# Patient Record
Sex: MALE | Race: WHITE | NOT HISPANIC OR LATINO | URBAN - METROPOLITAN AREA
[De-identification: names, ages, dates, MRNs, and addresses within clinical notes are randomized per-mention and may not be internally consistent; named-entity substitution may affect disease eponyms.]

---

## 2017-05-23 ENCOUNTER — OUTPATIENT (OUTPATIENT)
Dept: OUTPATIENT SERVICES | Facility: HOSPITAL | Age: 62
LOS: 1 days | End: 2017-05-23
Payer: COMMERCIAL

## 2017-05-23 PROCEDURE — 75574 CT ANGIO HRT W/3D IMAGE: CPT | Mod: 26

## 2017-05-23 PROCEDURE — 75574 CT ANGIO HRT W/3D IMAGE: CPT

## 2017-08-02 VITALS
SYSTOLIC BLOOD PRESSURE: 139 MMHG | HEIGHT: 67 IN | TEMPERATURE: 96 F | OXYGEN SATURATION: 97 % | DIASTOLIC BLOOD PRESSURE: 74 MMHG | WEIGHT: 160.28 LBS | RESPIRATION RATE: 16 BRPM | HEART RATE: 84 BPM

## 2017-08-02 NOTE — H&P ADULT - ASSESSMENT
Patient is a 63yo M, current smoker, with PMHx of HTN, DM-II, HLD, ankylosing spondylitis, ED s/p penile prosthesis and H. Pylori (recently completed abx course, followed by Dr. Christianson) who called his PMD complaining of SSCP radiating to the left arm while walking a few blocks approx one month ago presented today to [facility] for recommended cardiac cath and peripheral angiogram with possible intervention in light of pt's risk factors, CCS 3 anginal symptoms and abnormal CTA of coronaries.    ASA III and Mallampati III    OF NOTE    Risks & benefits of procedure and alternative therapy have been explained to the patient including but not limited to: allergic reaction, bleeding w/possible need for blood transfusion, infection, renal and vascular compromise, limb damage, arrhythmia, stroke, vessel dissection/perforation, Myocardial infarction, emergent CABG. Informed consent obtained and in chart.   Risks & benefits of procedure and alternative therapy have been explained to the patient including but not limited to: allergic reaction, bleeding w/possible need for blood transfusion, infection, renal and vascular compromise, limb damage, stroke, Myocardial infarction, vessel dissection/perforation, emergent Vascular Surgery. Informed consent obtained and in chart. Patient is a 63yo M, current smoker, with PMHx of HTN, DM-II, HLD, ankylosing spondylitis, ED s/p penile prosthesis and H. Pylori (recently completed abx course, followed by Dr. Christianson) who called his PMD complaining of SSCP radiating to the left arm while walking a few blocks approx one month ago presented today to [facility] for recommended cardiac cath and peripheral angiogram with possible intervention in light of pt's risk factors, CCS 3 anginal symptoms and abnormal CTA of coronaries.    ASA III and Mallampati III    OF NOTE: pt was loaded with  mg PO x1 and Plavix 300 as d/w Dr. Munoz prior to procedure.    Risks & benefits of procedure and alternative therapy have been explained to the patient including but not limited to: allergic reaction, bleeding w/possible need for blood transfusion, infection, renal and vascular compromise, limb damage, arrhythmia, stroke, vessel dissection/perforation, Myocardial infarction, emergent CABG. Informed consent obtained and in chart.   Risks & benefits of procedure and alternative therapy have been explained to the patient including but not limited to: allergic reaction, bleeding w/possible need for blood transfusion, infection, renal and vascular compromise, limb damage, stroke, Myocardial infarction, vessel dissection/perforation, emergent Vascular Surgery. Informed consent obtained and in chart.

## 2017-08-02 NOTE — H&P ADULT - NEGATIVE CARDIOVASCULAR SYMPTOMS
no paroxysmal nocturnal dyspnea/no dyspnea on exertion/no orthopnea/no palpitations/no peripheral edema/no claudication

## 2017-08-02 NOTE — H&P ADULT - RS GEN PE MLT RESP DETAILS PC
no rales/clear to auscultation bilaterally/no rhonchi/no wheezes wheezes/clear to auscultation bilaterally

## 2017-08-02 NOTE — H&P ADULT - HISTORY OF PRESENT ILLNESS
SKELETON    Patient is a 61yo M, current smoker, with PMHx of HTN, DM-II, HLD, hyperhomocysteinemia, mild carotid stenosis, ankylosing spondylitis, diastolic dysfunction who presented to his cardiologist complaining of CP on exertion with associated dizziness and near syncope. Patient subsequently underwent CTA coronaries which revealed midLAD moderate to severe mixed obstructive plaque (FFR 0.61), proxLCx moderate mixed borderline obstructive plaque (FFR 0.8), ostial RPDA moderate to severe mixed obstructive plaque (FFR 0.71), calcium score 200, normal LV function. In light of patient's risk factors, symptoms and abnormal CTA, patient is now referred to [facility] for recommended cardiac catheterization with possible intervention. Patient is a 61yo M, current smoker, with PMHx of HTN, DM-II, HLD, ankylosing spondylitis, ED s/p penile prosthesis and H. Pylori (recently completed abx course) who presented  Patient subsequently underwent CTA coronaries which revealed midLAD moderate to severe mixed obstructive plaque (FFR 0.61), proxLCx moderate mixed borderline obstructive plaque (FFR 0.8), ostial RPDA moderate to severe mixed obstructive plaque (FFR 0.71), calcium score 200, normal LV function. In light of patient's risk factors, symptoms and abnormal CTA, patient is now referred to [facility] for recommended cardiac catheterization with possible intervention. Patient is a 61yo M, current smoker, with PMHx of HTN, DM-II, HLD, ankylosing spondylitis, ED s/p penile prosthesis and H. Pylori (recently completed abx course, followed by Dr. Christianson) who called his PMD complaining of SSCP radiating to the left arm while walking a few blocks about one month ago and instructed him to go to the emergency room. Patient states his workup in the ED was negative and he was discharged home to follow up with his cardiologist. Patient also endorses dizziness when going from sitting/lying to standing position. Patient denies fatigue, palpitations, SOB, PND, orthopnea, N/V, hematochezia, melena, LE edema, syncope. Patient subsequently underwent CTA coronaries which revealed midLAD moderate to severe mixed obstructive plaque (FFR 0.61), proxLCx moderate mixed borderline obstructive plaque (FFR 0.8), ostial RPDA moderate to severe mixed obstructive plaque (FFR 0.71), calcium score 200, normal LV function. In light of patient's risk factors, class III anginal symptoms and abnormal CTA, patient is now referred to [facility] for recommended cardiac catheterization with possible intervention. Patient is a 63yo M, current smoker, with PMHx of HTN, DM-II, HLD, ankylosing spondylitis, ED s/p penile prosthesis and H. Pylori (recently completed abx course, followed by Dr. Christianson) who called his PMD complaining of SSCP radiating to the left arm while walking a few blocks approx one month ago and was instructed to go to the emergency room. Patient states his workup in the ED was negative and he was discharged home to follow up with his cardiologist. Patient also endorses dizziness when going from sitting/lying to standing position. Patient denies fatigue, palpitations, SOB, PND, orthopnea, N/V, hematochezia, melena, LE edema, syncope. Patient subsequently underwent CTA coronaries which revealed midLAD moderate to severe mixed obstructive plaque (FFR 0.61), proxLCx moderate mixed borderline obstructive plaque (FFR 0.8), ostial RPDA moderate to severe mixed obstructive plaque (FFR 0.71), calcium score 200, normal LV function. In light of patient's risk factors, class III anginal symptoms and abnormal CTA, patient is now referred to [facility] for recommended cardiac catheterization with possible intervention. Patient is a 61yo M, current smoker, with PMHx of HTN, DM-II, HLD, ankylosing spondylitis, ED s/p penile prosthesis and H. Pylori (recently completed abx course, followed by Dr. Christianson) who called his PMD complaining of SSCP radiating to the left arm while walking a few blocks approx one month ago and was instructed to go to the emergency room. Patient states his workup in the ED was negative and he was discharged home to follow up with his cardiologist. Patient also endorses dizziness when going from sitting/lying to standing position. Patient denies fatigue, palpitations, SOB, PND, orthopnea, N/V, hematochezia, melena, LE edema, syncope. Patient subsequently underwent CTA coronaries which revealed midLAD moderate to severe mixed obstructive plaque (FFR 0.61), proxLCx moderate mixed borderline obstructive plaque (FFR 0.8), ostial RPDA moderate to severe mixed obstructive plaque (FFR 0.71), calcium score 200, normal LV function.  In light of patient's risk factors, class III anginal symptoms and abnormal CTA, patient is now referred to [facility] for recommended cardiac catheterization and peripheral angiogram with possible intervention.

## 2017-08-04 ENCOUNTER — INPATIENT (INPATIENT)
Facility: HOSPITAL | Age: 62
LOS: 0 days | Discharge: HOME | DRG: 247 | End: 2017-08-05
Attending: INTERNAL MEDICINE | Admitting: INTERNAL MEDICINE
Payer: COMMERCIAL

## 2017-08-04 DIAGNOSIS — Z96.89 PRESENCE OF OTHER SPECIFIED FUNCTIONAL IMPLANTS: Chronic | ICD-10-CM

## 2017-08-04 LAB
ALBUMIN SERPL ELPH-MCNC: 4 G/DL — SIGNIFICANT CHANGE UP (ref 3.3–5)
ALP SERPL-CCNC: 77 U/L — SIGNIFICANT CHANGE UP (ref 40–120)
ALT FLD-CCNC: 11 U/L — SIGNIFICANT CHANGE UP (ref 10–45)
ANION GAP SERPL CALC-SCNC: 15 MMOL/L — SIGNIFICANT CHANGE UP (ref 5–17)
APTT BLD: 32.3 SEC — SIGNIFICANT CHANGE UP (ref 27.5–37.4)
AST SERPL-CCNC: 12 U/L — SIGNIFICANT CHANGE UP (ref 10–40)
BASOPHILS NFR BLD AUTO: 0.3 % — SIGNIFICANT CHANGE UP (ref 0–2)
BILIRUB SERPL-MCNC: 0.6 MG/DL — SIGNIFICANT CHANGE UP (ref 0.2–1.2)
BUN SERPL-MCNC: 18 MG/DL — SIGNIFICANT CHANGE UP (ref 7–23)
CALCIUM SERPL-MCNC: 9.2 MG/DL — SIGNIFICANT CHANGE UP (ref 8.4–10.5)
CHLORIDE SERPL-SCNC: 100 MMOL/L — SIGNIFICANT CHANGE UP (ref 96–108)
CHOLEST SERPL-MCNC: 112 MG/DL — SIGNIFICANT CHANGE UP (ref 10–199)
CK MB CFR SERPL CALC: 2.4 NG/ML — SIGNIFICANT CHANGE UP (ref 0–6.7)
CO2 SERPL-SCNC: 24 MMOL/L — SIGNIFICANT CHANGE UP (ref 22–31)
CREAT SERPL-MCNC: 0.6 MG/DL — SIGNIFICANT CHANGE UP (ref 0.5–1.3)
EOSINOPHIL NFR BLD AUTO: 1.5 % — SIGNIFICANT CHANGE UP (ref 0–6)
GLUCOSE SERPL-MCNC: 151 MG/DL — HIGH (ref 70–99)
HBA1C BLD-MCNC: 5.9 % — HIGH (ref 4–5.6)
HCT VFR BLD CALC: 48.3 % — SIGNIFICANT CHANGE UP (ref 39–50)
HDLC SERPL-MCNC: 53 MG/DL — SIGNIFICANT CHANGE UP (ref 40–125)
HGB BLD-MCNC: 17 G/DL — SIGNIFICANT CHANGE UP (ref 13–17)
INR BLD: 0.87 — LOW (ref 0.88–1.16)
LIPID PNL WITH DIRECT LDL SERPL: 46 MG/DL — SIGNIFICANT CHANGE UP
LYMPHOCYTES # BLD AUTO: 16.2 % — SIGNIFICANT CHANGE UP (ref 13–44)
MCHC RBC-ENTMCNC: 32.7 PG — SIGNIFICANT CHANGE UP (ref 27–34)
MCHC RBC-ENTMCNC: 35.2 G/DL — SIGNIFICANT CHANGE UP (ref 32–36)
MCV RBC AUTO: 92.9 FL — SIGNIFICANT CHANGE UP (ref 80–100)
MONOCYTES NFR BLD AUTO: 7.8 % — SIGNIFICANT CHANGE UP (ref 2–14)
NEUTROPHILS NFR BLD AUTO: 74.2 % — SIGNIFICANT CHANGE UP (ref 43–77)
PLATELET # BLD AUTO: 195 K/UL — SIGNIFICANT CHANGE UP (ref 150–400)
POTASSIUM SERPL-MCNC: 4.7 MMOL/L — SIGNIFICANT CHANGE UP (ref 3.5–5.3)
POTASSIUM SERPL-SCNC: 4.7 MMOL/L — SIGNIFICANT CHANGE UP (ref 3.5–5.3)
PROT SERPL-MCNC: 7.2 G/DL — SIGNIFICANT CHANGE UP (ref 6–8.3)
PROTHROM AB SERPL-ACNC: 9.6 SEC — LOW (ref 9.8–12.7)
RBC # BLD: 5.2 M/UL — SIGNIFICANT CHANGE UP (ref 4.2–5.8)
RBC # FLD: 12.2 % — SIGNIFICANT CHANGE UP (ref 10.3–16.9)
SODIUM SERPL-SCNC: 139 MMOL/L — SIGNIFICANT CHANGE UP (ref 135–145)
TOTAL CHOLESTEROL/HDL RATIO MEASUREMENT: 2.1 RATIO — LOW (ref 3.4–9.6)
TRIGL SERPL-MCNC: 63 MG/DL — SIGNIFICANT CHANGE UP (ref 10–149)
WBC # BLD: 11.8 K/UL — HIGH (ref 3.8–10.5)
WBC # FLD AUTO: 11.8 K/UL — HIGH (ref 3.8–10.5)

## 2017-08-04 PROCEDURE — 99222 1ST HOSP IP/OBS MODERATE 55: CPT

## 2017-08-04 PROCEDURE — 93010 ELECTROCARDIOGRAM REPORT: CPT

## 2017-08-04 PROCEDURE — 99254 IP/OBS CNSLTJ NEW/EST MOD 60: CPT

## 2017-08-04 RX ORDER — VENLAFAXINE HCL 75 MG
150 CAPSULE, EXT RELEASE 24 HR ORAL DAILY
Qty: 0 | Refills: 0 | Status: DISCONTINUED | OUTPATIENT
Start: 2017-08-04 | End: 2017-08-05

## 2017-08-04 RX ORDER — AMLODIPINE BESYLATE 2.5 MG/1
2.5 TABLET ORAL DAILY
Qty: 0 | Refills: 0 | Status: DISCONTINUED | OUTPATIENT
Start: 2017-08-04 | End: 2017-08-05

## 2017-08-04 RX ORDER — ASPIRIN/CALCIUM CARB/MAGNESIUM 324 MG
81 TABLET ORAL DAILY
Qty: 0 | Refills: 0 | Status: DISCONTINUED | OUTPATIENT
Start: 2017-08-05 | End: 2017-08-05

## 2017-08-04 RX ORDER — SODIUM CHLORIDE 9 MG/ML
500 INJECTION INTRAMUSCULAR; INTRAVENOUS; SUBCUTANEOUS
Qty: 0 | Refills: 0 | Status: DISCONTINUED | OUTPATIENT
Start: 2017-08-04 | End: 2017-08-05

## 2017-08-04 RX ORDER — CLOPIDOGREL BISULFATE 75 MG/1
75 TABLET, FILM COATED ORAL DAILY
Qty: 0 | Refills: 0 | Status: DISCONTINUED | OUTPATIENT
Start: 2017-08-05 | End: 2017-08-05

## 2017-08-04 RX ORDER — SODIUM CHLORIDE 9 MG/ML
1000 INJECTION, SOLUTION INTRAVENOUS
Qty: 0 | Refills: 0 | Status: DISCONTINUED | OUTPATIENT
Start: 2017-08-04 | End: 2017-08-05

## 2017-08-04 RX ORDER — ATORVASTATIN CALCIUM 80 MG/1
80 TABLET, FILM COATED ORAL AT BEDTIME
Qty: 0 | Refills: 0 | Status: DISCONTINUED | OUTPATIENT
Start: 2017-08-04 | End: 2017-08-05

## 2017-08-04 RX ORDER — DEXTROSE 50 % IN WATER 50 %
1 SYRINGE (ML) INTRAVENOUS ONCE
Qty: 0 | Refills: 0 | Status: DISCONTINUED | OUTPATIENT
Start: 2017-08-04 | End: 2017-08-04

## 2017-08-04 RX ORDER — INSULIN LISPRO 100/ML
VIAL (ML) SUBCUTANEOUS
Qty: 0 | Refills: 0 | Status: DISCONTINUED | OUTPATIENT
Start: 2017-08-04 | End: 2017-08-05

## 2017-08-04 RX ORDER — EMPAGLIFLOZIN 10 MG/1
1 TABLET, FILM COATED ORAL
Qty: 0 | Refills: 0 | COMMUNITY

## 2017-08-04 RX ORDER — VENLAFAXINE HCL 75 MG
1 CAPSULE, EXT RELEASE 24 HR ORAL
Qty: 0 | Refills: 0 | COMMUNITY

## 2017-08-04 RX ORDER — LOSARTAN POTASSIUM 100 MG/1
1 TABLET, FILM COATED ORAL
Qty: 0 | Refills: 0 | COMMUNITY

## 2017-08-04 RX ORDER — SODIUM CHLORIDE 9 MG/ML
1000 INJECTION, SOLUTION INTRAVENOUS
Qty: 0 | Refills: 0 | Status: DISCONTINUED | OUTPATIENT
Start: 2017-08-04 | End: 2017-08-04

## 2017-08-04 RX ORDER — LOSARTAN POTASSIUM 100 MG/1
25 TABLET, FILM COATED ORAL DAILY
Qty: 0 | Refills: 0 | Status: DISCONTINUED | OUTPATIENT
Start: 2017-08-04 | End: 2017-08-05

## 2017-08-04 RX ORDER — CLOPIDOGREL BISULFATE 75 MG/1
300 TABLET, FILM COATED ORAL ONCE
Qty: 0 | Refills: 0 | Status: COMPLETED | OUTPATIENT
Start: 2017-08-04 | End: 2017-08-04

## 2017-08-04 RX ORDER — CLOPIDOGREL BISULFATE 75 MG/1
1 TABLET, FILM COATED ORAL
Qty: 0 | Refills: 0 | COMMUNITY

## 2017-08-04 RX ORDER — DEXTROSE 50 % IN WATER 50 %
25 SYRINGE (ML) INTRAVENOUS ONCE
Qty: 0 | Refills: 0 | Status: DISCONTINUED | OUTPATIENT
Start: 2017-08-04 | End: 2017-08-05

## 2017-08-04 RX ORDER — DEXTROSE 50 % IN WATER 50 %
25 SYRINGE (ML) INTRAVENOUS ONCE
Qty: 0 | Refills: 0 | Status: DISCONTINUED | OUTPATIENT
Start: 2017-08-04 | End: 2017-08-04

## 2017-08-04 RX ORDER — SODIUM CHLORIDE 9 MG/ML
500 INJECTION INTRAMUSCULAR; INTRAVENOUS; SUBCUTANEOUS
Qty: 0 | Refills: 0 | Status: DISCONTINUED | OUTPATIENT
Start: 2017-08-04 | End: 2017-08-04

## 2017-08-04 RX ORDER — ASPIRIN/CALCIUM CARB/MAGNESIUM 324 MG
1 TABLET ORAL
Qty: 0 | Refills: 0 | COMMUNITY

## 2017-08-04 RX ORDER — ATORVASTATIN CALCIUM 80 MG/1
1 TABLET, FILM COATED ORAL
Qty: 0 | Refills: 0 | COMMUNITY

## 2017-08-04 RX ORDER — GLUCAGON INJECTION, SOLUTION 0.5 MG/.1ML
1 INJECTION, SOLUTION SUBCUTANEOUS ONCE
Qty: 0 | Refills: 0 | Status: DISCONTINUED | OUTPATIENT
Start: 2017-08-04 | End: 2017-08-04

## 2017-08-04 RX ORDER — ASPIRIN/CALCIUM CARB/MAGNESIUM 324 MG
325 TABLET ORAL ONCE
Qty: 0 | Refills: 0 | Status: COMPLETED | OUTPATIENT
Start: 2017-08-04 | End: 2017-08-04

## 2017-08-04 RX ORDER — CHLORHEXIDINE GLUCONATE 213 G/1000ML
1 SOLUTION TOPICAL ONCE
Qty: 0 | Refills: 0 | Status: DISCONTINUED | OUTPATIENT
Start: 2017-08-04 | End: 2017-08-05

## 2017-08-04 RX ORDER — GLUCAGON INJECTION, SOLUTION 0.5 MG/.1ML
1 INJECTION, SOLUTION SUBCUTANEOUS ONCE
Qty: 0 | Refills: 0 | Status: DISCONTINUED | OUTPATIENT
Start: 2017-08-04 | End: 2017-08-05

## 2017-08-04 RX ORDER — DEXTROSE 50 % IN WATER 50 %
12.5 SYRINGE (ML) INTRAVENOUS ONCE
Qty: 0 | Refills: 0 | Status: DISCONTINUED | OUTPATIENT
Start: 2017-08-04 | End: 2017-08-04

## 2017-08-04 RX ORDER — DEXTROSE 50 % IN WATER 50 %
1 SYRINGE (ML) INTRAVENOUS ONCE
Qty: 0 | Refills: 0 | Status: DISCONTINUED | OUTPATIENT
Start: 2017-08-04 | End: 2017-08-05

## 2017-08-04 RX ORDER — DEXTROSE 50 % IN WATER 50 %
12.5 SYRINGE (ML) INTRAVENOUS ONCE
Qty: 0 | Refills: 0 | Status: DISCONTINUED | OUTPATIENT
Start: 2017-08-04 | End: 2017-08-05

## 2017-08-04 RX ORDER — INSULIN LISPRO 100/ML
VIAL (ML) SUBCUTANEOUS ONCE
Qty: 0 | Refills: 0 | Status: COMPLETED | OUTPATIENT
Start: 2017-08-04 | End: 2017-08-04

## 2017-08-04 RX ADMIN — ATORVASTATIN CALCIUM 80 MILLIGRAM(S): 80 TABLET, FILM COATED ORAL at 21:51

## 2017-08-04 RX ADMIN — CLOPIDOGREL BISULFATE 300 MILLIGRAM(S): 75 TABLET, FILM COATED ORAL at 13:06

## 2017-08-04 RX ADMIN — SODIUM CHLORIDE 75 MILLILITER(S): 9 INJECTION INTRAMUSCULAR; INTRAVENOUS; SUBCUTANEOUS at 19:17

## 2017-08-04 RX ADMIN — SODIUM CHLORIDE 75 MILLILITER(S): 9 INJECTION INTRAMUSCULAR; INTRAVENOUS; SUBCUTANEOUS at 12:42

## 2017-08-04 RX ADMIN — Medication 2: at 21:51

## 2017-08-04 RX ADMIN — Medication 325 MILLIGRAM(S): at 13:00

## 2017-08-05 ENCOUNTER — TRANSCRIPTION ENCOUNTER (OUTPATIENT)
Age: 62
End: 2017-08-05

## 2017-08-05 VITALS — TEMPERATURE: 97 F

## 2017-08-05 LAB
ALBUMIN SERPL ELPH-MCNC: 3.9 G/DL — SIGNIFICANT CHANGE UP (ref 3.3–5)
ALP SERPL-CCNC: 75 U/L — SIGNIFICANT CHANGE UP (ref 40–120)
ALT FLD-CCNC: 10 U/L — SIGNIFICANT CHANGE UP (ref 10–45)
ANION GAP SERPL CALC-SCNC: 11 MMOL/L — SIGNIFICANT CHANGE UP (ref 5–17)
AST SERPL-CCNC: 13 U/L — SIGNIFICANT CHANGE UP (ref 10–40)
BILIRUB SERPL-MCNC: 0.3 MG/DL — SIGNIFICANT CHANGE UP (ref 0.2–1.2)
BUN SERPL-MCNC: 16 MG/DL — SIGNIFICANT CHANGE UP (ref 7–23)
CALCIUM SERPL-MCNC: 9 MG/DL — SIGNIFICANT CHANGE UP (ref 8.4–10.5)
CHLORIDE SERPL-SCNC: 105 MMOL/L — SIGNIFICANT CHANGE UP (ref 96–108)
CO2 SERPL-SCNC: 25 MMOL/L — SIGNIFICANT CHANGE UP (ref 22–31)
CREAT SERPL-MCNC: 0.6 MG/DL — SIGNIFICANT CHANGE UP (ref 0.5–1.3)
GLUCOSE SERPL-MCNC: 244 MG/DL — HIGH (ref 70–99)
HCT VFR BLD CALC: 47 % — SIGNIFICANT CHANGE UP (ref 39–50)
HGB BLD-MCNC: 15.5 G/DL — SIGNIFICANT CHANGE UP (ref 13–17)
MAGNESIUM SERPL-MCNC: 2 MG/DL — SIGNIFICANT CHANGE UP (ref 1.6–2.6)
MCHC RBC-ENTMCNC: 31.6 PG — SIGNIFICANT CHANGE UP (ref 27–34)
MCHC RBC-ENTMCNC: 33 G/DL — SIGNIFICANT CHANGE UP (ref 32–36)
MCV RBC AUTO: 95.7 FL — SIGNIFICANT CHANGE UP (ref 80–100)
PLATELET # BLD AUTO: 171 K/UL — SIGNIFICANT CHANGE UP (ref 150–400)
POTASSIUM SERPL-MCNC: 4.3 MMOL/L — SIGNIFICANT CHANGE UP (ref 3.5–5.3)
POTASSIUM SERPL-SCNC: 4.3 MMOL/L — SIGNIFICANT CHANGE UP (ref 3.5–5.3)
PROT SERPL-MCNC: 6.2 G/DL — SIGNIFICANT CHANGE UP (ref 6–8.3)
RBC # BLD: 4.91 M/UL — SIGNIFICANT CHANGE UP (ref 4.2–5.8)
RBC # FLD: 12.1 % — SIGNIFICANT CHANGE UP (ref 10.3–16.9)
SODIUM SERPL-SCNC: 141 MMOL/L — SIGNIFICANT CHANGE UP (ref 135–145)
WBC # BLD: 10.6 K/UL — HIGH (ref 3.8–10.5)
WBC # FLD AUTO: 10.6 K/UL — HIGH (ref 3.8–10.5)

## 2017-08-05 PROCEDURE — 93010 ELECTROCARDIOGRAM REPORT: CPT

## 2017-08-05 PROCEDURE — 99239 HOSP IP/OBS DSCHRG MGMT >30: CPT

## 2017-08-05 RX ORDER — METOPROLOL TARTRATE 50 MG
25 TABLET ORAL
Qty: 0 | Refills: 0 | Status: DISCONTINUED | OUTPATIENT
Start: 2017-08-05 | End: 2017-08-05

## 2017-08-05 RX ORDER — SITAGLIPTIN AND METFORMIN HYDROCHLORIDE 500; 50 MG/1; MG/1
1 TABLET, FILM COATED ORAL
Qty: 0 | Refills: 0 | COMMUNITY

## 2017-08-05 RX ORDER — METOPROLOL TARTRATE 50 MG
1 TABLET ORAL
Qty: 0 | Refills: 0 | COMMUNITY
Start: 2017-08-05

## 2017-08-05 RX ORDER — METOPROLOL TARTRATE 50 MG
25 TABLET ORAL DAILY
Qty: 0 | Refills: 0 | Status: DISCONTINUED | OUTPATIENT
Start: 2017-08-05 | End: 2017-08-05

## 2017-08-05 RX ORDER — METOPROLOL TARTRATE 50 MG
1 TABLET ORAL
Qty: 30 | Refills: 1 | OUTPATIENT
Start: 2017-08-05 | End: 2017-10-03

## 2017-08-05 RX ORDER — AMLODIPINE BESYLATE 2.5 MG/1
1 TABLET ORAL
Qty: 0 | Refills: 0 | COMMUNITY

## 2017-08-05 RX ORDER — METOPROLOL TARTRATE 50 MG
1 TABLET ORAL
Qty: 60 | Refills: 1 | OUTPATIENT
Start: 2017-08-05 | End: 2017-10-03

## 2017-08-05 RX ADMIN — Medication 3: at 06:11

## 2017-08-05 RX ADMIN — LOSARTAN POTASSIUM 25 MILLIGRAM(S): 100 TABLET, FILM COATED ORAL at 06:11

## 2017-08-05 RX ADMIN — CLOPIDOGREL BISULFATE 75 MILLIGRAM(S): 75 TABLET, FILM COATED ORAL at 10:48

## 2017-08-05 RX ADMIN — AMLODIPINE BESYLATE 2.5 MILLIGRAM(S): 2.5 TABLET ORAL at 06:11

## 2017-08-05 RX ADMIN — Medication 81 MILLIGRAM(S): at 10:48

## 2017-08-05 RX ADMIN — Medication 150 MILLIGRAM(S): at 10:48

## 2017-08-05 RX ADMIN — Medication 25 MILLIGRAM(S): at 11:50

## 2017-08-05 NOTE — DISCHARGE NOTE ADULT - PATIENT PORTAL LINK FT
“You can access the Follow[facility] Patient Portal, offered by [facility], by registering with the following website: [email or web address]”

## 2017-08-05 NOTE — DISCHARGE NOTE ADULT - PLAN OF CARE
Continue Aspirin and Plavix (also known as Clopidogrel) - You had stent to the LAD artery.  Please continue Aspirin and Plavix to keep the stent open.   - Follow up with Dr. Zavala today at [place].   -Adding a new medication for the heart which can take care of the blood pressure as well. it is called Metoprolol 25 mg by mouth 1 tablet twice daily.   We send the prescription to your local pharmacy [place].  Future refill to be done by your cardiologist  - Wound care: Avoid strenuous activities such as lifting, running, pushing or sex for 1 week in order to avoid bleeding complications in the groin.  If any questions about the groin, call us at [phone].  Ok to shower tomorrow evening. Avoid bathing for 1 week - Restart Janumet on Monday 8/7/17  - ok to continue Jardiance today.   Low carb diet -Continue your Losartan and Amlopidine  Low salt diet - You had stent to the LAD artery.  Please continue Aspirin and Plavix to keep the stent open.   - Follow up with Dr. Zavala today at [place].   -Adding a new medication for the heart which can take care of the blood pressure as well. it is called Metoprolol succinate 25 mg by mouth 1 tablet once daily.   We send the prescription to your local pharmacy [place].  Future refill to be done by your cardiologist  - Wound care: Avoid strenuous activities such as lifting, running, pushing or sex for 1 week in order to avoid bleeding complications in the groin.  If any questions about the groin, call us at [phone].  Ok to shower tomorrow evening. Avoid bathing for 1 week -Continue your Losartan.    - Stop Amlodipine  Low salt diet

## 2017-08-05 NOTE — DISCHARGE NOTE ADULT - MEDICATION SUMMARY - MEDICATIONS TO TAKE
I will START or STAY ON the medications listed below when I get home from the hospital:    aspirin 81 mg oral tablet  -- 1 tab(s) by mouth once a day  -- Indication: For Coronary artery disease / stent     losartan 25 mg oral tablet  -- 1 tab(s) by mouth once a day  -- Indication: For HTN (hypertension)    venlafaxine 150 mg oral capsule, extended release  -- 1 cap(s) by mouth once a day  -- Indication: For Depression     Janumet 50 mg-1000 mg oral tablet  -- 1 tab(s) by mouth 2 times a day  ** resume on Monday 8/7/17  -- Indication: For DM (diabetes mellitus)    Jardiance 25 mg oral tablet  -- 1 tab(s) by mouth once a day (in the morning)  -- Indication: For DM (diabetes mellitus)    atorvastatin 80 mg oral tablet  -- 1 tab(s) by mouth once a day  -- Indication: For Cholesterol lowering    Plavix 75 mg oral tablet  -- 1 tab(s) by mouth once a day  -- Indication: For Coronary artery disease / stent     Metoprolol Tartrate 25 mg oral tablet  -- 1 tab(s) by mouth 2 times a day  ** NEW medication  -- It is very important that you take or use this exactly as directed.  Do not skip doses or discontinue unless directed by your doctor.  May cause drowsiness.  Alcohol may intensify this effect.  Use care when operating dangerous machinery.  Some non-prescription drugs may aggravate your condition.  Read all labels carefully.  If a warning appears, check with your doctor before taking.  Take with food or milk.  This drug may impair the ability to drive or operate machinery.  Use care until you become familiar with its effects.    -- Indication: For Coronary artery disease / hypertension     Norvasc 2.5 mg oral tablet  -- 1 tab(s) by mouth once a day  -- Indication: For HTN (hypertension) I will START or STAY ON the medications listed below when I get home from the hospital:    aspirin 81 mg oral tablet  -- 1 tab(s) by mouth once a day  -- Indication: For Coronary artery disease / stent     losartan 25 mg oral tablet  -- 1 tab(s) by mouth once a day  -- Indication: For HTN (hypertension)    venlafaxine 150 mg oral capsule, extended release  -- 1 cap(s) by mouth once a day  -- Indication: For Depression     Jardiance 25 mg oral tablet  -- 1 tab(s) by mouth once a day (in the morning)  -- Indication: For DM (diabetes mellitus)    Janumet 50 mg-1000 mg oral tablet  -- 1 tab(s) by mouth 2 times a day  ** resume on Monday 8/7/17  -- Indication: For DM (diabetes mellitus)    atorvastatin 80 mg oral tablet  -- 1 tab(s) by mouth once a day  -- Indication: For Cholesterol lowering    Plavix 75 mg oral tablet  -- 1 tab(s) by mouth once a day  -- Indication: For Coronary artery disease / stent     metoprolol succinate 25 mg oral tablet, extended release  -- 1 tab(s) by mouth once a day  ** NEW med  -- Indication: For Coronary artery disease / blood pressure

## 2017-08-05 NOTE — DISCHARGE NOTE ADULT - CARE PROVIDER_API CALL
Reno Munoz (MD; PhD), Cardiovascular Disease; Interventional Cardiology  [facility] [address] [place] [address]  Phone: [phone]  Fax: [phone]

## 2017-08-05 NOTE — DISCHARGE NOTE ADULT - CARE PLAN
Principal Discharge DX:	CAD (coronary artery disease)  Goal:	Continue Aspirin and Plavix (also known as Clopidogrel)  Instructions for follow-up, activity and diet:	- You had stent to the LAD artery.  Please continue Aspirin and Plavix to keep the stent open.   - Follow up with Dr. Zavala today at [place].   -Adding a new medication for the heart which can take care of the blood pressure as well. it is called Metoprolol 25 mg by mouth 1 tablet twice daily.   We send the prescription to your local pharmacy [place].  Future refill to be done by your cardiologist  - Wound care: Avoid strenuous activities such as lifting, running, pushing or sex for 1 week in order to avoid bleeding complications in the groin.  If any questions about the groin, call us at [phone].  Ok to shower tomorrow evening. Avoid bathing for 1 week  Secondary Diagnosis:	DM (diabetes mellitus)  Instructions for follow-up, activity and diet:	- Restart Janumet on Monday 8/7/17  - ok to continue Jardiance today.   Low carb diet  Secondary Diagnosis:	HTN (hypertension)  Instructions for follow-up, activity and diet:	-Continue your Losartan and Amlopidine  Low salt diet Principal Discharge DX:	CAD (coronary artery disease)  Goal:	Continue Aspirin and Plavix (also known as Clopidogrel)  Instructions for follow-up, activity and diet:	- You had stent to the LAD artery.  Please continue Aspirin and Plavix to keep the stent open.   - Follow up with Dr. Zavala today at [place].   -Adding a new medication for the heart which can take care of the blood pressure as well. it is called Metoprolol 25 mg by mouth 1 tablet twice daily.   We send the prescription to your local pharmacy [place].  Future refill to be done by your cardiologist  - Wound care: Avoid strenuous activities such as lifting, running, pushing or sex for 1 week in order to avoid bleeding complications in the groin.  If any questions about the groin, call us at [phone].  Ok to shower tomorrow evening. Avoid bathing for 1 week  Secondary Diagnosis:	DM (diabetes mellitus)  Instructions for follow-up, activity and diet:	- Restart Janumet on Monday 8/7/17  - ok to continue Jardiance today.   Low carb diet  Secondary Diagnosis:	HTN (hypertension)  Instructions for follow-up, activity and diet:	-Continue your Losartan and Amlopidine  Low salt diet Principal Discharge DX:	CAD (coronary artery disease)  Goal:	Continue Aspirin and Plavix (also known as Clopidogrel)  Instructions for follow-up, activity and diet:	- You had stent to the LAD artery.  Please continue Aspirin and Plavix to keep the stent open.   - Follow up with Dr. Zavala today at [place].   -Adding a new medication for the heart which can take care of the blood pressure as well. it is called Metoprolol 25 mg by mouth 1 tablet twice daily.   We send the prescription to your local pharmacy [place].  Future refill to be done by your cardiologist  - Wound care: Avoid strenuous activities such as lifting, running, pushing or sex for 1 week in order to avoid bleeding complications in the groin.  If any questions about the groin, call us at [phone].  Ok to shower tomorrow evening. Avoid bathing for 1 week  Secondary Diagnosis:	DM (diabetes mellitus)  Instructions for follow-up, activity and diet:	- Restart Janumet on Monday 8/7/17  - ok to continue Jardiance today.   Low carb diet  Secondary Diagnosis:	HTN (hypertension)  Instructions for follow-up, activity and diet:	-Continue your Losartan and Amlopidine  Low salt diet Principal Discharge DX:	CAD (coronary artery disease)  Goal:	Continue Aspirin and Plavix (also known as Clopidogrel)  Instructions for follow-up, activity and diet:	- You had stent to the LAD artery.  Please continue Aspirin and Plavix to keep the stent open.   - Follow up with Dr. Zavala today at [place].   -Adding a new medication for the heart which can take care of the blood pressure as well. it is called Metoprolol succinate 25 mg by mouth 1 tablet once daily.   We send the prescription to your local pharmacy [place].  Future refill to be done by your cardiologist  - Wound care: Avoid strenuous activities such as lifting, running, pushing or sex for 1 week in order to avoid bleeding complications in the groin.  If any questions about the groin, call us at [phone].  Ok to shower tomorrow evening. Avoid bathing for 1 week  Secondary Diagnosis:	DM (diabetes mellitus)  Instructions for follow-up, activity and diet:	- Restart Janumet on Monday 8/7/17  - ok to continue Jardiance today.   Low carb diet  Secondary Diagnosis:	HTN (hypertension)  Instructions for follow-up, activity and diet:	-Continue your Losartan.    - Stop Amlodipine  Low salt diet Principal Discharge DX:	CAD (coronary artery disease)  Goal:	Continue Aspirin and Plavix (also known as Clopidogrel)  Instructions for follow-up, activity and diet:	- You had stent to the LAD artery.  Please continue Aspirin and Plavix to keep the stent open.   - Follow up with Dr. Zavala today at [place].   -Adding a new medication for the heart which can take care of the blood pressure as well. it is called Metoprolol succinate 25 mg by mouth 1 tablet once daily.   We send the prescription to your local pharmacy [place].  Future refill to be done by your cardiologist  - Wound care: Avoid strenuous activities such as lifting, running, pushing or sex for 1 week in order to avoid bleeding complications in the groin.  If any questions about the groin, call us at [phone].  Ok to shower tomorrow evening. Avoid bathing for 1 week  Secondary Diagnosis:	DM (diabetes mellitus)  Instructions for follow-up, activity and diet:	- Restart Janumet on Monday 8/7/17  - ok to continue Jardiance today.   Low carb diet  Secondary Diagnosis:	HTN (hypertension)  Instructions for follow-up, activity and diet:	-Continue your Losartan.    - Stop Amlodipine  Low salt diet

## 2017-08-05 NOTE — DISCHARGE NOTE ADULT - HOSPITAL COURSE
63yo M, current smoker, with PMHx of HTN, DM-II, HLD, ankylosing spondylitis, ED s/p penile prosthesis and H. Pylori (recently completed abx course, followed by Dr. Christianson) who called his PMD complaining of SSCP radiating to the left arm while walking a few blocks approx one month ago and was instructed to go to the emergency room. Patient states his workup in the ED was negative and he was discharged home to follow up with his cardiologist. Patient also endorses dizziness when going from sitting/lying to standing position. Patient denies fatigue, palpitations, SOB, PND, orthopnea, N/V, hematochezia, melena, LE edema, syncope. Patient subsequently underwent CTA coronaries which revealed midLAD moderate to severe mixed obstructive plaque (FFR 0.61), proxLCx moderate mixed borderline obstructive plaque (FFR 0.8), ostial RPDA moderate to severe mixed obstructive plaque (FFR 0.71), calcium score 200, normal LV function.  In light of patient's risk factors, class III anginal symptoms and abnormal CTA, patient is now referred to [facility] for recommended cardiac catheterization and peripheral angiogram with possible intervention. 61yo M, current smoker, with PMHx of HTN, DM-II, HLD, ankylosing spondylitis, ED s/p penile prosthesis and H. Pylori (recently completed abx course, followed by Dr. Christianson) who called his PMD complaining of SSCP radiating to the left arm while walking a few blocks approx one month ago and was instructed to go to the emergency room. Patient states his workup in the ED was negative and he was discharged home to follow up with his cardiologist. Patient also endorses dizziness when going from sitting/lying to standing position. Patient denies fatigue, palpitations, SOB, PND, orthopnea, N/V, hematochezia, melena, LE edema, syncope. Patient subsequently underwent CTA coronaries which revealed midLAD moderate to severe mixed obstructive plaque (FFR 0.61), proxLCx moderate mixed borderline obstructive plaque (FFR 0.8), ostial RPDA moderate to severe mixed obstructive plaque (FFR 0.71), calcium score 200, normal LV function.  In light of patient's risk factors, class III anginal symptoms and abnormal CTA, patient is now referred to [facility] for recommended cardiac catheterization and peripheral angiogram with possible intervention.  s/p cath with NATACHA to prox and mid LAD on 8/4/17. Peripheral angiogram was done as well, no signficant disease. Add Metoprolol for CAD.  Stable for discharge and see Dr. Zavala today in [place].

## 2017-08-07 PROCEDURE — 83735 ASSAY OF MAGNESIUM: CPT

## 2017-08-07 PROCEDURE — C1769: CPT

## 2017-08-07 PROCEDURE — C1894: CPT

## 2017-08-07 PROCEDURE — C1887: CPT

## 2017-08-07 PROCEDURE — C1760: CPT

## 2017-08-07 PROCEDURE — 85730 THROMBOPLASTIN TIME PARTIAL: CPT

## 2017-08-07 PROCEDURE — 85025 COMPLETE CBC W/AUTO DIFF WBC: CPT

## 2017-08-07 PROCEDURE — C1889: CPT

## 2017-08-07 PROCEDURE — 93005 ELECTROCARDIOGRAM TRACING: CPT

## 2017-08-07 PROCEDURE — 85610 PROTHROMBIN TIME: CPT

## 2017-08-07 PROCEDURE — 80053 COMPREHEN METABOLIC PANEL: CPT

## 2017-08-07 PROCEDURE — C1725: CPT

## 2017-08-07 PROCEDURE — 82550 ASSAY OF CK (CPK): CPT

## 2017-08-07 PROCEDURE — C1874: CPT

## 2017-08-07 PROCEDURE — 36415 COLL VENOUS BLD VENIPUNCTURE: CPT

## 2017-08-07 PROCEDURE — 80061 LIPID PANEL: CPT

## 2017-08-07 PROCEDURE — 83036 HEMOGLOBIN GLYCOSYLATED A1C: CPT

## 2017-08-07 PROCEDURE — C1753: CPT

## 2017-08-07 PROCEDURE — 82553 CREATINE MB FRACTION: CPT

## 2017-08-07 PROCEDURE — 82248 BILIRUBIN DIRECT: CPT

## 2017-08-07 PROCEDURE — 85027 COMPLETE CBC AUTOMATED: CPT

## 2017-08-08 DIAGNOSIS — I10 ESSENTIAL (PRIMARY) HYPERTENSION: ICD-10-CM

## 2017-08-08 DIAGNOSIS — I25.118 ATHEROSCLEROTIC HEART DISEASE OF NATIVE CORONARY ARTERY WITH OTHER FORMS OF ANGINA PECTORIS: ICD-10-CM

## 2017-08-08 DIAGNOSIS — F32.9 MAJOR DEPRESSIVE DISORDER, SINGLE EPISODE, UNSPECIFIED: ICD-10-CM

## 2017-08-08 DIAGNOSIS — M45.9 ANKYLOSING SPONDYLITIS OF UNSPECIFIED SITES IN SPINE: ICD-10-CM

## 2017-08-08 DIAGNOSIS — Z79.84 LONG TERM (CURRENT) USE OF ORAL HYPOGLYCEMIC DRUGS: ICD-10-CM

## 2017-08-08 DIAGNOSIS — F17.210 NICOTINE DEPENDENCE, CIGARETTES, UNCOMPLICATED: ICD-10-CM

## 2017-08-08 DIAGNOSIS — E11.9 TYPE 2 DIABETES MELLITUS WITHOUT COMPLICATIONS: ICD-10-CM

## 2017-08-08 DIAGNOSIS — I73.9 PERIPHERAL VASCULAR DISEASE, UNSPECIFIED: ICD-10-CM

## 2017-08-08 DIAGNOSIS — R07.9 CHEST PAIN, UNSPECIFIED: ICD-10-CM

## 2017-12-06 PROBLEM — K27.9 PEPTIC ULCER, SITE UNSPECIFIED, UNSPECIFIED AS ACUTE OR CHRONIC, WITHOUT HEMORRHAGE OR PERFORATION: Chronic | Status: ACTIVE | Noted: 2017-08-02

## 2017-12-06 PROBLEM — E78.5 HYPERLIPIDEMIA, UNSPECIFIED: Chronic | Status: ACTIVE | Noted: 2017-08-02

## 2017-12-06 PROBLEM — I10 ESSENTIAL (PRIMARY) HYPERTENSION: Chronic | Status: ACTIVE | Noted: 2017-08-02

## 2017-12-06 PROBLEM — M45.9 ANKYLOSING SPONDYLITIS OF UNSPECIFIED SITES IN SPINE: Chronic | Status: ACTIVE | Noted: 2017-08-02

## 2017-12-06 PROBLEM — E11.9 TYPE 2 DIABETES MELLITUS WITHOUT COMPLICATIONS: Chronic | Status: ACTIVE | Noted: 2017-08-02

## 2017-12-12 ENCOUNTER — APPOINTMENT (OUTPATIENT)
Dept: UROLOGY | Facility: CLINIC | Age: 62
End: 2017-12-12
Payer: MEDICARE

## 2017-12-12 DIAGNOSIS — Z78.9 OTHER SPECIFIED HEALTH STATUS: ICD-10-CM

## 2017-12-12 DIAGNOSIS — M45.9 ANKYLOSING SPONDYLITIS OF UNSPECIFIED SITES IN SPINE: ICD-10-CM

## 2017-12-12 DIAGNOSIS — R35.0 FREQUENCY OF MICTURITION: ICD-10-CM

## 2017-12-12 DIAGNOSIS — E11.9 TYPE 2 DIABETES MELLITUS W/OUT COMPLICATIONS: ICD-10-CM

## 2017-12-12 DIAGNOSIS — F17.200 NICOTINE DEPENDENCE, UNSPECIFIED, UNCOMPLICATED: ICD-10-CM

## 2017-12-12 LAB
BILIRUB UR QL STRIP: NORMAL
CLARITY UR: CLEAR
GLUCOSE UR-MCNC: NORMAL
HCG UR QL: 0.2 EU/DL
HGB UR QL STRIP.AUTO: NORMAL
KETONES UR-MCNC: NORMAL
LEUKOCYTE ESTERASE UR QL STRIP: NORMAL
NITRITE UR QL STRIP: NORMAL
PH UR STRIP: 5.5
PROT UR STRIP-MCNC: NORMAL
SP GR UR STRIP: 1.01

## 2017-12-12 PROCEDURE — 51798 US URINE CAPACITY MEASURE: CPT | Mod: 59

## 2017-12-12 PROCEDURE — 99204 OFFICE O/P NEW MOD 45 MIN: CPT

## 2017-12-12 PROCEDURE — 51741 ELECTRO-UROFLOWMETRY FIRST: CPT

## 2017-12-12 RX ORDER — SITAGLIPTIN AND METFORMIN HYDROCHLORIDE 50; 500 MG/1; MG/1
50-500 TABLET, FILM COATED ORAL
Refills: 0 | Status: ACTIVE | COMMUNITY

## 2017-12-12 RX ORDER — ASPIRIN 81 MG
81 TABLET,CHEWABLE ORAL
Refills: 0 | Status: ACTIVE | COMMUNITY

## 2017-12-12 RX ORDER — METOPROLOL TARTRATE 50 MG/1
50 TABLET, FILM COATED ORAL
Refills: 0 | Status: ACTIVE | COMMUNITY

## 2017-12-12 RX ORDER — LOSARTAN POTASSIUM 25 MG/1
25 TABLET, FILM COATED ORAL
Refills: 0 | Status: ACTIVE | COMMUNITY

## 2017-12-12 RX ORDER — CLOPIDOGREL 75 MG/1
75 TABLET, FILM COATED ORAL
Refills: 0 | Status: ACTIVE | COMMUNITY

## 2017-12-12 RX ORDER — EMPAGLIFLOZIN 25 MG/1
25 TABLET, FILM COATED ORAL
Refills: 0 | Status: ACTIVE | COMMUNITY

## 2017-12-12 RX ORDER — AMLODIPINE BESYLATE 2.5 MG/1
2.5 TABLET ORAL
Refills: 0 | Status: ACTIVE | COMMUNITY

## 2017-12-12 RX ORDER — ATORVASTATIN CALCIUM 80 MG/1
80 TABLET, FILM COATED ORAL
Refills: 0 | Status: ACTIVE | COMMUNITY

## 2017-12-13 PROBLEM — Z78.9 SOCIAL ALCOHOL USE: Status: ACTIVE | Noted: 2017-12-13

## 2017-12-13 PROBLEM — F17.200 CURRENT SOME DAY SMOKER: Status: ACTIVE | Noted: 2017-12-13

## 2017-12-13 PROBLEM — M45.9 ANKYLOSING SPONDYLITIS: Status: ACTIVE | Noted: 2017-12-13

## 2017-12-13 PROBLEM — E11.9 TYPE 2 DIABETES MELLITUS: Status: ACTIVE | Noted: 2017-12-13

## 2017-12-18 ENCOUNTER — OUTPATIENT (OUTPATIENT)
Dept: OUTPATIENT SERVICES | Facility: HOSPITAL | Age: 62
LOS: 1 days | End: 2017-12-18
Payer: MEDICARE

## 2017-12-18 DIAGNOSIS — Z96.89 PRESENCE OF OTHER SPECIFIED FUNCTIONAL IMPLANTS: Chronic | ICD-10-CM

## 2017-12-18 DIAGNOSIS — R07.9 CHEST PAIN, UNSPECIFIED: ICD-10-CM

## 2017-12-18 DIAGNOSIS — I25.10 ATHEROSCLEROTIC HEART DISEASE OF NATIVE CORONARY ARTERY WITHOUT ANGINA PECTORIS: ICD-10-CM

## 2017-12-18 PROCEDURE — 93016 CV STRESS TEST SUPVJ ONLY: CPT

## 2017-12-18 PROCEDURE — 78452 HT MUSCLE IMAGE SPECT MULT: CPT

## 2017-12-18 PROCEDURE — 78452 HT MUSCLE IMAGE SPECT MULT: CPT | Mod: 26

## 2017-12-18 PROCEDURE — 93017 CV STRESS TEST TRACING ONLY: CPT

## 2017-12-18 PROCEDURE — A9505: CPT

## 2017-12-18 PROCEDURE — A9500: CPT

## 2017-12-18 PROCEDURE — 93018 CV STRESS TEST I&R ONLY: CPT

## 2018-12-18 NOTE — H&P ADULT - PMH
How Severe Is This Condition?: mild Ankylosing spondylitis    DM (diabetes mellitus)    H pylori ulcer    HLD (hyperlipidemia)    HTN (hypertension)

## 2019-04-22 ENCOUNTER — OUTPATIENT (OUTPATIENT)
Dept: OUTPATIENT SERVICES | Facility: HOSPITAL | Age: 64
LOS: 1 days | End: 2019-04-22
Payer: MEDICARE

## 2019-04-22 DIAGNOSIS — I25.9 CHRONIC ISCHEMIC HEART DISEASE, UNSPECIFIED: ICD-10-CM

## 2019-04-22 DIAGNOSIS — Z96.89 PRESENCE OF OTHER SPECIFIED FUNCTIONAL IMPLANTS: Chronic | ICD-10-CM

## 2019-04-22 PROCEDURE — 93018 CV STRESS TEST I&R ONLY: CPT

## 2019-04-22 PROCEDURE — A9502: CPT

## 2019-04-22 PROCEDURE — 78452 HT MUSCLE IMAGE SPECT MULT: CPT | Mod: 26

## 2019-04-22 PROCEDURE — 93016 CV STRESS TEST SUPVJ ONLY: CPT

## 2019-04-22 PROCEDURE — 78452 HT MUSCLE IMAGE SPECT MULT: CPT

## 2019-04-22 PROCEDURE — 93017 CV STRESS TEST TRACING ONLY: CPT

## 2023-09-20 NOTE — DISCHARGE NOTE ADULT - NSTOBACCOHOTLINE_GEN_A_NCS
The patient has been re-examined and I agree with the above assessment or I updated with my findings. [facility] Smokers Quitline ([id number])